# Patient Record
Sex: FEMALE | Race: BLACK OR AFRICAN AMERICAN | NOT HISPANIC OR LATINO
[De-identification: names, ages, dates, MRNs, and addresses within clinical notes are randomized per-mention and may not be internally consistent; named-entity substitution may affect disease eponyms.]

---

## 2019-06-20 PROBLEM — Z00.00 ENCOUNTER FOR PREVENTIVE HEALTH EXAMINATION: Status: ACTIVE | Noted: 2019-06-20

## 2019-07-26 ENCOUNTER — APPOINTMENT (OUTPATIENT)
Dept: RHEUMATOLOGY | Facility: CLINIC | Age: 29
End: 2019-07-26
Payer: COMMERCIAL

## 2019-07-26 VITALS
WEIGHT: 130 LBS | OXYGEN SATURATION: 100 % | HEIGHT: 61 IN | HEART RATE: 103 BPM | TEMPERATURE: 98.9 F | BODY MASS INDEX: 24.55 KG/M2 | DIASTOLIC BLOOD PRESSURE: 83 MMHG | SYSTOLIC BLOOD PRESSURE: 131 MMHG

## 2019-07-26 DIAGNOSIS — R76.8 OTHER SPECIFIED ABNORMAL IMMUNOLOGICAL FINDINGS IN SERUM: ICD-10-CM

## 2019-07-26 PROCEDURE — 99205 OFFICE O/P NEW HI 60 MIN: CPT | Mod: GC

## 2019-07-26 NOTE — PHYSICAL EXAM
[General Appearance - In No Acute Distress] : in no acute distress [General Appearance - Well Nourished] : well nourished [General Appearance - Alert] : alert [PERRL With Normal Accommodation] : pupils were equal in size, round, and reactive to light [General Appearance - Well-Appearing] : healthy appearing [Sclera] : the sclera and conjunctiva were normal [Extraocular Movements] : extraocular movements were intact [Neck Appearance] : the appearance of the neck was normal [Thyroid Diffuse Enlargement] : the thyroid was not enlarged [Respiration, Rhythm And Depth] : normal respiratory rhythm and effort [Heart Rate And Rhythm] : heart rate was normal and rhythm regular [Auscultation Breath Sounds / Voice Sounds] : lungs were clear to auscultation bilaterally [Heart Sounds] : normal S1 and S2 [Heart Sounds Gallop] : no gallops [Heart Sounds Pericardial Friction Rub] : no pericardial rub [Murmurs] : no murmurs [Abdomen Tenderness] : non-tender [Abdomen Soft] : soft [Bowel Sounds] : normal bowel sounds [Supraclavicular Lymph Nodes Enlarged Bilaterally] : supraclavicular [Cervical Lymph Nodes Enlarged Posterior Bilaterally] : posterior cervical [Cervical Lymph Nodes Enlarged Anterior Bilaterally] : anterior cervical [] : no rash [Abnormal Walk] : normal gait [Skin Color & Pigmentation] : normal skin color and pigmentation [No Spinal Tenderness] : no spinal tenderness [Cranial Nerves] : cranial nerves 2-12 were intact [Motor Exam] : the motor exam was normal [Oriented To Time, Place, And Person] : oriented to person, place, and time [Affect] : the affect was normal

## 2019-07-29 PROBLEM — R76.8 RHEUMATOID FACTOR POSITIVE: Status: ACTIVE | Noted: 2019-07-29

## 2019-07-29 NOTE — ASSESSMENT
[FreeTextEntry1] : 29 y/o female with no significant medical history presenting with mildly elevated RF, negative RENATA and ESR as per dermatology work up. Patient has no active complaints; physical exam benign. \par Patient educated on possible signs and symptoms and will follow up with dermatology or rheumatology within one year for retesting, or sooner if symptoms develop.

## 2019-07-29 NOTE — HISTORY OF PRESENT ILLNESS
[Fatigue] : fatigue [Skin Lesions] : skin lesions [Muscle Cramping] : muscle cramping [FreeTextEntry1] : 27 y/o [place] female with no significant PMH referred by dermatologist (Dr. Joy) for follow up. Patient had a mildly elevated RF, negative RENATA and ESR. Patient denies rashes, decreased range of motion, morning stiffness, joint pain, joint swelling, Raynaud phenomenon, or history of spontaneous abortions. Patient denies fevers, chills, OB, chest pain, or cough. No history of STDs/STIs. No history of psoriasis, UC/Crohn's or other autoimmune conditions. Patient endorses a lower back and sciatic pain attributed to sport's injury in high school. Patient follows with physical therapy to improve muscle weakness and pain. \par \par \par \par hives itchy rised bump\par \par no rashes\par rh 12.6 esr normal \par sciatic pain goes to physical therapy flares 1x a year \par npo on computer all day \par \par 8 weeks miscarriage - abortio \par \par aderol 10 mg 2x a day\par \par  [Weight Loss] : no weight loss [Malaise] : no malaise [Anorexia] : no anorexia [Chills] : no chills [Fever] : no fever [Skin Nodules] : no skin nodules [Malar Facial Rash] : no malar facial rash [Depression] : no depression [Cough] : no cough [Oral Ulcers] : no oral ulcers [Dysphagia] : no dysphagia [Dry Mouth] : no dry mouth [Dysphonia] : no dysphonia [Shortness of Breath] : no shortness of breath [Chest Pain] : no chest pain [Arthralgias] : no arthralgias [Joint Swelling] : no joint swelling [Joint Warmth] : no joint warmth [Joint Deformity] : no joint deformity [Decreased ROM] : no decreased range of motion [Morning Stiffness] : no morning stiffness [Difficulty Standing] : no difficulty standing [Falls] : no falls [Difficulty Walking] : no difficulty walking [Dyspnea] : no dyspnea [Myalgias] : no myalgias [Muscle Weakness] : no muscle weakness [Muscle Spasms] : no muscle spasms [Visual Changes] : no visual changes [Eye Pain] : no eye pain [Eye Redness] : no eye redness [Dry Eyes] : no dry eyes

## 2019-07-29 NOTE — ADDENDUM
[FreeTextEntry1] : Patient seen and examined with Dr. Haynes. \par Agree with history, physical exam, assessment and plan\par 28 year old presents with mildly elevated RF from dermatology. \par No additional manifestations to support this. No concern for underlying autoimmune condition. \par Follow up in one year to see if any new symptoms develop.